# Patient Record
Sex: FEMALE | Race: BLACK OR AFRICAN AMERICAN | NOT HISPANIC OR LATINO | Employment: UNEMPLOYED | URBAN - METROPOLITAN AREA
[De-identification: names, ages, dates, MRNs, and addresses within clinical notes are randomized per-mention and may not be internally consistent; named-entity substitution may affect disease eponyms.]

---

## 2018-11-19 ENCOUNTER — TRANSCRIBE ORDERS (OUTPATIENT)
Dept: DIABETES SERVICES | Facility: HOSPITAL | Age: 23
End: 2018-11-19

## 2018-11-19 DIAGNOSIS — E11.9 DIABETES MELLITUS WITHOUT COMPLICATION (HCC): Primary | ICD-10-CM

## 2019-12-19 ENCOUNTER — HOSPITAL ENCOUNTER (EMERGENCY)
Facility: HOSPITAL | Age: 24
Discharge: HOME | End: 2019-12-19
Attending: EMERGENCY MEDICINE | Admitting: EMERGENCY MEDICINE

## 2019-12-19 ENCOUNTER — APPOINTMENT (OUTPATIENT)
Dept: GENERAL RADIOLOGY | Facility: HOSPITAL | Age: 24
End: 2019-12-19

## 2019-12-19 VITALS
OXYGEN SATURATION: 100 % | WEIGHT: 210 LBS | DIASTOLIC BLOOD PRESSURE: 80 MMHG | HEART RATE: 90 BPM | HEIGHT: 69 IN | TEMPERATURE: 98.5 F | BODY MASS INDEX: 31.1 KG/M2 | SYSTOLIC BLOOD PRESSURE: 115 MMHG | RESPIRATION RATE: 16 BRPM

## 2019-12-19 DIAGNOSIS — R05.3 PERSISTENT COUGH FOR 3 WEEKS OR LONGER: Primary | ICD-10-CM

## 2019-12-19 DIAGNOSIS — J40 BRONCHITIS: ICD-10-CM

## 2019-12-19 PROCEDURE — 94640 AIRWAY INHALATION TREATMENT: CPT

## 2019-12-19 PROCEDURE — 71046 X-RAY EXAM CHEST 2 VIEWS: CPT

## 2019-12-19 PROCEDURE — 99283 EMERGENCY DEPT VISIT LOW MDM: CPT

## 2019-12-19 RX ORDER — ALBUTEROL SULFATE 2.5 MG/3ML
2.5 SOLUTION RESPIRATORY (INHALATION) ONCE
Status: COMPLETED | OUTPATIENT
Start: 2019-12-19 | End: 2019-12-19

## 2019-12-19 RX ORDER — BENZONATATE 100 MG/1
100 CAPSULE ORAL 3 TIMES DAILY PRN
Qty: 21 CAPSULE | Refills: 0 | OUTPATIENT
Start: 2019-12-19 | End: 2022-06-30

## 2019-12-19 RX ORDER — ALBUTEROL SULFATE 90 UG/1
2 AEROSOL, METERED RESPIRATORY (INHALATION) EVERY 6 HOURS PRN
Qty: 6.7 G | Refills: 0 | OUTPATIENT
Start: 2019-12-19 | End: 2022-06-30

## 2019-12-19 RX ORDER — PREDNISONE 10 MG/1
TABLET ORAL
Qty: 21 TABLET | Refills: 0 | OUTPATIENT
Start: 2019-12-19 | End: 2022-06-30

## 2019-12-19 RX ORDER — CEFDINIR 300 MG/1
300 CAPSULE ORAL 2 TIMES DAILY
Qty: 20 CAPSULE | Refills: 0 | OUTPATIENT
Start: 2019-12-19 | End: 2022-06-30

## 2019-12-19 RX ADMIN — ALBUTEROL SULFATE 2.5 MG: 2.5 SOLUTION RESPIRATORY (INHALATION) at 11:58

## 2019-12-19 NOTE — ED PROVIDER NOTES
Subjective   Rashid Iglesias is a 24 y.o. female presenting to the emergency department for evaluation of sore throat and cough with blood in her sputum. She reports that she has been sick since Thanksgiving and was first evaluated for her complaints at [facility] on 11/25/19. She was then diagnosed with a viral upper respiratory infection and put on antibiotics. A few days later, she developed some right-sided facial pain that prompted her to return to the ED, wherein she was told that her pain was a result of an ear infection. She then developed a sore throat prompting her to return again, wherein she tested negative for strep throat. She developed some acute soreness in her throat yesterday. She notes associated chills and subjective fever, but has not taken her temperature. She is not currently on any antibiotics. She is unsure as to what may be triggering her symptoms. Her medical history is remarkable for type 2 diabetes mellitus, hypertension, and anemia. She is a non-smoker and denies any illicit drug or alcohol use. Her last normal menstrual period was 11/19/19. She is followed by the associates at [facility]. There are no other acute complaints at this time.      History provided by:  Patient  Sore Throat   Quality:  Sore  Severity:  Moderate  Onset quality:  Sudden  Duration:  1 day  Timing:  Constant  Progression:  Unchanged  Chronicity:  Recurrent  Relieved by:  None tried  Worsened by:  Nothing  Ineffective treatments:  None tried  Associated symptoms: chills, cough and fever (subjective)    Associated symptoms: no abdominal pain, no adenopathy, no chest pain, no ear discharge and no headaches        Review of Systems   Constitutional: Positive for chills and fever (subjective).   HENT: Positive for sore throat. Negative for ear discharge and sinus pain.    Respiratory: Positive for cough.    Cardiovascular: Negative for chest pain.   Gastrointestinal: Negative for abdominal  pain, nausea and vomiting.   Genitourinary: Negative for dysuria.   Musculoskeletal: Negative for back pain.   Skin: Negative for color change.   Allergic/Immunologic: Negative for immunocompromised state.   Neurological: Negative for headaches.   Hematological: Negative for adenopathy.   Psychiatric/Behavioral: Negative.    All other systems reviewed and are negative.      Past Medical History:   Diagnosis Date   • Anemia    • Diabetes mellitus (CMS/HCC)    • Hypertension        No Known Allergies    History reviewed. No pertinent surgical history.    History reviewed. No pertinent family history.    Social History     Socioeconomic History   • Marital status: Single     Spouse name: Not on file   • Number of children: Not on file   • Years of education: Not on file   • Highest education level: Not on file   Tobacco Use   • Smoking status: Never Smoker   • Smokeless tobacco: Never Used   Substance and Sexual Activity   • Alcohol use: Never     Frequency: Never   • Drug use: Never   • Sexual activity: Defer         Objective   Physical Exam   Constitutional: She is oriented to person, place, and time. She appears well-developed and well-nourished. She does not appear ill. No distress.        HENT:   Head: Normocephalic and atraumatic.   Right Ear: Tympanic membrane normal.   Left Ear: Tympanic membrane normal.   Mouth/Throat: Oropharynx is clear and moist.   No pharyngeal erythema or exudate. Normal TMs. No sinus tenderness.   Eyes: Pupils are equal, round, and reactive to light. Conjunctivae are normal. No scleral icterus.   Neck: Normal range of motion. Neck supple.   No cervical lymphadenopathy.   Cardiovascular: Normal rate, regular rhythm and normal heart sounds.   No murmur heard.  Pulmonary/Chest: Effort normal and breath sounds normal. No respiratory distress.   Abdominal: Soft. Bowel sounds are normal. There is no tenderness.   Musculoskeletal: Normal range of motion. She exhibits no edema.   No lower  extremity edema.   Neurological: She is alert and oriented to person, place, and time.   Skin: Skin is warm and dry.   Psychiatric: She has a normal mood and affect. Her behavior is normal.   Nursing note and vitals reviewed.      Procedures         ED Course   Nothing acute on CXR.  Cough has been present since before Thanksgiving.  Will tx with abx, steroids, albuterol MDI and Tessalon and have f/u with her PCP.  No results found for this or any previous visit (from the past 24 hour(s)).  Note: In addition to lab results from this visit, the labs listed above may include labs taken at another facility or during a different encounter within the last 24 hours. Please correlate lab times with ED admission and discharge times for further clarification of the services performed during this visit.    XR Chest 2 View   Preliminary Result   No evidence of active airspace disease.                Vitals:    12/19/19 1127 12/19/19 1130 12/19/19 1158 12/19/19 1200   BP: (!) 140/102 150/100  141/89   BP Location:       Patient Position:       Pulse:   90    Resp:   16    Temp:       TempSrc:       SpO2: 100% 99% 99% 100%   Weight:       Height:         Medications   albuterol (PROVENTIL) nebulizer solution 0.083% 2.5 mg/3mL (2.5 mg Nebulization Given 12/19/19 1158)     ECG/EMG Results (last 24 hours)     ** No results found for the last 24 hours. **        No orders to display                       MDM    Final diagnoses:   Persistent cough for 3 weeks or longer   Bronchitis       Documentation assistance provided by patti Leavitt.  Information recorded by the scribe was done at my direction and has been verified and validated by me.     Oralia Leavitt  12/19/19 1212       Tommy Calero, PA  12/19/19 1218

## 2022-06-30 PROCEDURE — 87529 HSV DNA AMP PROBE: CPT | Performed by: FAMILY MEDICINE

## 2022-07-02 ENCOUNTER — HOSPITAL ENCOUNTER (EMERGENCY)
Facility: HOSPITAL | Age: 27
Discharge: HOME | End: 2022-07-02
Attending: STUDENT IN AN ORGANIZED HEALTH CARE EDUCATION/TRAINING PROGRAM | Admitting: STUDENT IN AN ORGANIZED HEALTH CARE EDUCATION/TRAINING PROGRAM

## 2022-07-02 VITALS
WEIGHT: 190 LBS | SYSTOLIC BLOOD PRESSURE: 141 MMHG | DIASTOLIC BLOOD PRESSURE: 94 MMHG | OXYGEN SATURATION: 98 % | BODY MASS INDEX: 28.14 KG/M2 | HEIGHT: 69 IN | HEART RATE: 91 BPM | RESPIRATION RATE: 16 BRPM | TEMPERATURE: 98.2 F

## 2022-07-02 DIAGNOSIS — N94.9 GENITAL LESION, FEMALE: ICD-10-CM

## 2022-07-02 DIAGNOSIS — Z91.199 H/O NONCOMPLIANCE WITH MEDICAL TREATMENT, PRESENTING HAZARDS TO HEALTH: ICD-10-CM

## 2022-07-02 DIAGNOSIS — Z86.39 HISTORY OF DIABETES MELLITUS: ICD-10-CM

## 2022-07-02 DIAGNOSIS — Z86.79 HISTORY OF HYPERTENSION: ICD-10-CM

## 2022-07-02 DIAGNOSIS — R10.2 VAGINAL PAIN: Primary | ICD-10-CM

## 2022-07-02 PROCEDURE — 99283 EMERGENCY DEPT VISIT LOW MDM: CPT

## 2022-07-02 RX ORDER — VALACYCLOVIR HYDROCHLORIDE 500 MG/1
1000 TABLET, FILM COATED ORAL EVERY 12 HOURS SCHEDULED
Status: DISCONTINUED | OUTPATIENT
Start: 2022-07-02 | End: 2022-07-02

## 2022-07-02 RX ORDER — VALACYCLOVIR HYDROCHLORIDE 500 MG/1
1000 TABLET, FILM COATED ORAL ONCE
Status: COMPLETED | OUTPATIENT
Start: 2022-07-02 | End: 2022-07-02

## 2022-07-02 RX ORDER — VALACYCLOVIR HYDROCHLORIDE 1 G/1
1000 TABLET, FILM COATED ORAL 2 TIMES DAILY
Qty: 14 TABLET | Refills: 0 | Status: SHIPPED | OUTPATIENT
Start: 2022-07-02

## 2022-07-02 RX ORDER — LIDOCAINE HYDROCHLORIDE 20 MG/ML
10 SOLUTION OROPHARYNGEAL
Status: DISCONTINUED | OUTPATIENT
Start: 2022-07-02 | End: 2022-07-02 | Stop reason: HOSPADM

## 2022-07-02 RX ADMIN — LIDOCAINE HYDROCHLORIDE 10 ML: 20 SOLUTION ORAL; TOPICAL at 05:49

## 2022-07-02 RX ADMIN — VALACYCLOVIR HYDROCHLORIDE 1000 MG: 500 TABLET, FILM COATED ORAL at 05:49

## 2022-07-02 NOTE — DISCHARGE INSTRUCTIONS
Patient has multiple, painful, genital lesions.  Herpes culture is in process.  Physical exam does not appear consistent with vaginal yeast infection or folliculitis.  We will treat patient for genital herpes with Valtrex 1 g by mouth twice daily x7 days.  Recommend first available recheck with Dr. Harrison, gynecologist.  We also sent patient home with viscous lidocaine and recommend her to use this topically every 3 hours as needed for discomfort.  Return to the ER if worsening symptoms.  Strongly recommend close follow-up with PCP this week as scheduled for long-term management of diabetes and hypertension.  Patient needs to get back on some medications for these chronic health conditions.

## 2022-07-02 NOTE — ED PROVIDER NOTES
Subjective   This is a 27-year-old female that presents the ER with vaginal pain x3 days with multiple genital lesions that seem to be increasing in number.  Patient denies any new sexual partners.  Patient says that she has been with the same sexual partner for over 1 year.  Patient denies any vaginal discharge or irregular vaginal bleeding.  Patient has past medical history significant for diabetes mellitus and hypertension.  Both of these conditions are untreated currently because patient says that her medications were not covered by insurance.  She is getting ready to establish with a new PCP next week and start back on antidiabetic medications.  She was seen at [facility] on 6/30/2022 and diagnosed with probable yeast infection, as well as folliculitis.  Patient says that she has herpes culture in process.  She was prescribed Diflucan as well as Keflex.  Patient does not have a routine gynecologist.  Patient came in because she says that there are more lesions on her vagina on the inside.  She reports significant pain.  She denies any dysuria, urgency, or frequency, but when she urinates, the urine causes pain to the vagina due to the genital lesions.  Patient denies any systemic symptoms of fever, chills, abdominal pain, or nausea/vomiting.  No other concerns at this time.      History provided by:  Patient  Vaginal Pain  Location:  Vaginal pain x 3 days, recent diagnosis of folliculitis and yeast infection.  Timing:  Constant  Chronicity:  New  Context:  Pt reports vaginal pain and discomfort.   LMP: 6/11/22.  Patient denies any vaginal discharge or labial swelling. In a monogamous relationship.  Seen at [facility] 6/30/22 and dx with folliculitis and yeast infection.  Rx for Diflucan and Keflex.  Here because sxs are worse.  Ineffective treatments:  Rx at [facility] for Diflucan and Keflex for questionable folliculitis.  Associated symptoms: rash (vaginal region)    Associated symptoms: no abdominal pain, no chest pain, no  fatigue, no fever, no nausea and no vomiting    Risk factors:  History of DM.  Pt has not been taking anti-diabetic meds because her insurance didn't cover the meds.  She will get back into a PCP next week to get DM under control.      Review of Systems   Constitutional: Negative.  Negative for activity change, appetite change, chills, diaphoresis, fatigue and fever.   Cardiovascular: Negative for chest pain.   Gastrointestinal: Negative.  Negative for abdominal pain, nausea and vomiting.   Genitourinary: Positive for genital sores (Multiple genital lesions on the vagina, quite painful.) and vaginal pain. Negative for decreased urine volume, dysuria, flank pain, frequency, hematuria, vaginal bleeding and vaginal discharge.        LMP: 6/11/22   Musculoskeletal: Negative.  Negative for back pain.   Skin: Positive for rash (vaginal region).   All other systems reviewed and are negative.      Past Medical History:   Diagnosis Date   • Anemia    • Diabetes mellitus (HCC)    • Hypertension        No Known Allergies    No past surgical history on file.    No family history on file.    Social History     Socioeconomic History   • Marital status: Single   Tobacco Use   • Smoking status: Never Smoker   • Smokeless tobacco: Never Used   Substance and Sexual Activity   • Alcohol use: Never   • Drug use: Never   • Sexual activity: Defer           Objective   Physical Exam  Vitals and nursing note reviewed.   Constitutional:       Appearance: Normal appearance.   HENT:      Head: Normocephalic and atraumatic.      Nose: Nose normal.      Mouth/Throat:      Mouth: Mucous membranes are moist.      Pharynx: Oropharynx is clear.   Eyes:      Extraocular Movements: Extraocular movements intact.      Conjunctiva/sclera: Conjunctivae normal.      Pupils: Pupils are equal, round, and reactive to light.   Cardiovascular:      Rate and Rhythm: Regular rhythm. Tachycardia present.  No extrasystoles are present.     Pulses: Normal pulses.       Heart sounds: Normal heart sounds.      Comments: Mild tachycardia  Pulmonary:      Effort: Pulmonary effort is normal.      Breath sounds: Normal breath sounds.   Abdominal:      General: Bowel sounds are normal. There is no distension.      Palpations: Abdomen is soft.      Tenderness: There is no abdominal tenderness. There is no right CVA tenderness, left CVA tenderness, guarding or rebound.      Comments: Abdomen soft and nontender.   Genitourinary:     Vagina: Tenderness and lesions present. No vaginal discharge, erythema or bleeding.      Comments: Patient has numerous genital lesions inside the vaginal mucosa.  There is no significant labial swelling.  No vaginal discharge.  These lesions appear almost vesicular in nature and are very sensitive and tender.  Exam is concerning for genital herpes.  There is definitely no evidence of folliculitis to the groin or inguinal region.  Musculoskeletal:         General: Normal range of motion.      Cervical back: Normal range of motion and neck supple.   Skin:     General: Skin is warm and dry.   Neurological:      General: No focal deficit present.      Mental Status: She is alert.         Procedures           ED Course  ED Course as of 07/02/22 0510   Sat Jul 02, 2022   0504 Pelvic exam is concerning for genital herpes.  Patient has herpes culture in process from [facility] on 6/30/2022.  She is already being treated for possible vaginal candidiasis and folliculitis with Diflucan and Keflex.  We will prescribe Valtrex 1 g by mouth twice daily x7 days.  We will give close follow-up with GYN, Dr. Harrison.  We also will give 3, 10 mL containers of viscous lidocaine to help topically with discomfort.  Patient verbalized understanding.  Also strongly recommend that she follow-up closely with PCP this week as scheduled for chronic management of hypertension and diabetes mellitus.  Exam does not appear consistent with folliculitis or vaginal yeast infection. [FC]      ED Course  "User Index  [FC] Adela Ochoa PA-C            No results found for this or any previous visit (from the past 24 hour(s)).  Note: In addition to lab results from this visit, the labs listed above may include labs taken at another facility or during a different encounter within the last 24 hours. Please correlate lab times with ED admission and discharge times for further clarification of the services performed during this visit.    No orders to display     Vitals:    07/02/22 0247   BP: (!) 148/104   BP Location: Left arm   Patient Position: Sitting   Pulse: 115   Resp: 14   Temp: 98.2 °F (36.8 °C)   TempSrc: Oral   SpO2: 100%   Weight: 86.2 kg (190 lb)   Height: 175.3 cm (69\")     Medications   Lidocaine Viscous HCl (XYLOCAINE) 2 % solution 10 mL (has no administration in time range)   valACYclovir (VALTREX) tablet 1,000 mg (has no administration in time range)     ECG/EMG Results (last 24 hours)     ** No results found for the last 24 hours. **        No orders to display                                         MDM    Final diagnoses:   Vaginal pain   Genital lesion, female   History of diabetes mellitus   History of hypertension   H/O noncompliance with medical treatment, presenting hazards to health       ED Disposition  ED Disposition     ED Disposition   Discharge    Condition   Stable    Comment   --             Pari Harrison MD  [address] [facility]  [address] [place] [address]  [phone]    Call in 3 days  Call Tuesday for first available recheck    [facility] Emergency Department  [address] [facility] [address] [phone]    If symptoms worsen         Medication List      New Prescriptions    valACYclovir 1000 MG tablet  Commonly known as: VALTREX  Take 1 tablet by mouth 2 (Two) Times a Day.           Where to Get Your Medications      These medications were sent to [facility] [address] [place] [address] - [phone] PH - " [phone] FX  [address] [address]    Phone: [phone]   · valACYclovir 1000 MG tablet          Adela Ochoa PA-C  07/02/22 0510

## 2022-07-21 ENCOUNTER — LAB (OUTPATIENT)
Dept: LAB | Facility: HOSPITAL | Age: 27
End: 2022-07-21

## 2022-07-21 ENCOUNTER — OFFICE VISIT (OUTPATIENT)
Dept: FAMILY MEDICINE CLINIC | Facility: CLINIC | Age: 27
End: 2022-07-21

## 2022-07-21 ENCOUNTER — PATIENT ROUNDING (BHMG ONLY) (OUTPATIENT)
Dept: FAMILY MEDICINE CLINIC | Facility: CLINIC | Age: 27
End: 2022-07-21

## 2022-07-21 VITALS
SYSTOLIC BLOOD PRESSURE: 122 MMHG | HEART RATE: 111 BPM | OXYGEN SATURATION: 99 % | BODY MASS INDEX: 28.85 KG/M2 | DIASTOLIC BLOOD PRESSURE: 82 MMHG | HEIGHT: 69 IN | WEIGHT: 194.8 LBS

## 2022-07-21 DIAGNOSIS — Z00.00 ENCOUNTER FOR MEDICAL EXAMINATION TO ESTABLISH CARE: ICD-10-CM

## 2022-07-21 DIAGNOSIS — E11.9 TYPE 2 DIABETES MELLITUS WITHOUT COMPLICATION, WITHOUT LONG-TERM CURRENT USE OF INSULIN: ICD-10-CM

## 2022-07-21 DIAGNOSIS — E11.65 TYPE 2 DIABETES MELLITUS WITH HYPERGLYCEMIA, WITH LONG-TERM CURRENT USE OF INSULIN: Primary | ICD-10-CM

## 2022-07-21 DIAGNOSIS — D50.9 IRON DEFICIENCY ANEMIA, UNSPECIFIED IRON DEFICIENCY ANEMIA TYPE: ICD-10-CM

## 2022-07-21 DIAGNOSIS — Z11.3 SCREENING EXAMINATION FOR STD (SEXUALLY TRANSMITTED DISEASE): ICD-10-CM

## 2022-07-21 DIAGNOSIS — Z79.4 TYPE 2 DIABETES MELLITUS WITH HYPERGLYCEMIA, WITH LONG-TERM CURRENT USE OF INSULIN: Primary | ICD-10-CM

## 2022-07-21 DIAGNOSIS — N96 HISTORY OF RECURRENT MISCARRIAGES: ICD-10-CM

## 2022-07-21 DIAGNOSIS — N92.0 MENORRHAGIA WITH REGULAR CYCLE: ICD-10-CM

## 2022-07-21 PROBLEM — E11.3393 MODERATE NONPROLIFERATIVE DIABETIC RETINOPATHY OF BOTH EYES WITHOUT MACULAR EDEMA ASSOCIATED WITH TYPE 2 DIABETES MELLITUS (HCC): Status: RESOLVED | Noted: 2021-07-16 | Resolved: 2022-07-21

## 2022-07-21 PROBLEM — E11.3393 MODERATE NONPROLIFERATIVE DIABETIC RETINOPATHY OF BOTH EYES WITHOUT MACULAR EDEMA ASSOCIATED WITH TYPE 2 DIABETES MELLITUS (HCC): Status: ACTIVE | Noted: 2021-07-16

## 2022-07-21 LAB
ALBUMIN SERPL-MCNC: 4.4 G/DL (ref 3.5–5.2)
ALBUMIN/GLOB SERPL: 1.1 G/DL
ALP SERPL-CCNC: 73 U/L (ref 39–117)
ALT SERPL W P-5'-P-CCNC: 47 U/L (ref 1–33)
ANION GAP SERPL CALCULATED.3IONS-SCNC: 12.2 MMOL/L (ref 5–15)
AST SERPL-CCNC: 53 U/L (ref 1–32)
BILIRUB SERPL-MCNC: 0.4 MG/DL (ref 0–1.2)
BUN SERPL-MCNC: 8 MG/DL (ref 6–20)
BUN/CREAT SERPL: 13.3 (ref 7–25)
CALCIUM SPEC-SCNC: 9.2 MG/DL (ref 8.6–10.5)
CHLORIDE SERPL-SCNC: 100 MMOL/L (ref 98–107)
CHOLEST SERPL-MCNC: 180 MG/DL (ref 0–200)
CO2 SERPL-SCNC: 24.8 MMOL/L (ref 22–29)
CREAT SERPL-MCNC: 0.6 MG/DL (ref 0.57–1)
EGFRCR SERPLBLD CKD-EPI 2021: 127.1 ML/MIN/1.73
EXPIRATION DATE: NORMAL
GLOBULIN UR ELPH-MCNC: 4.1 GM/DL
GLUCOSE SERPL-MCNC: 253 MG/DL (ref 65–99)
HBA1C MFR BLD: 11.2 %
HDLC SERPL-MCNC: 56 MG/DL (ref 40–60)
IRON 24H UR-MRATE: 15 MCG/DL (ref 37–145)
IRON SATN MFR SERPL: 2 % (ref 20–50)
LDLC SERPL CALC-MCNC: 113 MG/DL (ref 0–100)
LDLC/HDLC SERPL: 2.02 {RATIO}
Lab: NORMAL
POTASSIUM SERPL-SCNC: 3.7 MMOL/L (ref 3.5–5.2)
PROT SERPL-MCNC: 8.5 G/DL (ref 6–8.5)
SODIUM SERPL-SCNC: 137 MMOL/L (ref 136–145)
TIBC SERPL-MCNC: 642 MCG/DL (ref 298–536)
TRANSFERRIN SERPL-MCNC: 431 MG/DL (ref 200–360)
TRIGL SERPL-MCNC: 55 MG/DL (ref 0–150)
VLDLC SERPL-MCNC: 11 MG/DL (ref 5–40)

## 2022-07-21 PROCEDURE — 87591 N.GONORRHOEAE DNA AMP PROB: CPT | Performed by: STUDENT IN AN ORGANIZED HEALTH CARE EDUCATION/TRAINING PROGRAM

## 2022-07-21 PROCEDURE — 80061 LIPID PANEL: CPT

## 2022-07-21 PROCEDURE — 84466 ASSAY OF TRANSFERRIN: CPT

## 2022-07-21 PROCEDURE — 82570 ASSAY OF URINE CREATININE: CPT | Performed by: STUDENT IN AN ORGANIZED HEALTH CARE EDUCATION/TRAINING PROGRAM

## 2022-07-21 PROCEDURE — 86341 ISLET CELL ANTIBODY: CPT

## 2022-07-21 PROCEDURE — 99204 OFFICE O/P NEW MOD 45 MIN: CPT | Performed by: STUDENT IN AN ORGANIZED HEALTH CARE EDUCATION/TRAINING PROGRAM

## 2022-07-21 PROCEDURE — 86337 INSULIN ANTIBODIES: CPT

## 2022-07-21 PROCEDURE — 82728 ASSAY OF FERRITIN: CPT

## 2022-07-21 PROCEDURE — 82043 UR ALBUMIN QUANTITATIVE: CPT | Performed by: STUDENT IN AN ORGANIZED HEALTH CARE EDUCATION/TRAINING PROGRAM

## 2022-07-21 PROCEDURE — 85007 BL SMEAR W/DIFF WBC COUNT: CPT

## 2022-07-21 PROCEDURE — 80050 GENERAL HEALTH PANEL: CPT

## 2022-07-21 PROCEDURE — 83540 ASSAY OF IRON: CPT

## 2022-07-21 PROCEDURE — 3046F HEMOGLOBIN A1C LEVEL >9.0%: CPT | Performed by: STUDENT IN AN ORGANIZED HEALTH CARE EDUCATION/TRAINING PROGRAM

## 2022-07-21 PROCEDURE — 87491 CHLMYD TRACH DNA AMP PROBE: CPT | Performed by: STUDENT IN AN ORGANIZED HEALTH CARE EDUCATION/TRAINING PROGRAM

## 2022-07-21 PROCEDURE — 83036 HEMOGLOBIN GLYCOSYLATED A1C: CPT | Performed by: STUDENT IN AN ORGANIZED HEALTH CARE EDUCATION/TRAINING PROGRAM

## 2022-07-21 PROCEDURE — 87661 TRICHOMONAS VAGINALIS AMPLIF: CPT | Performed by: STUDENT IN AN ORGANIZED HEALTH CARE EDUCATION/TRAINING PROGRAM

## 2022-07-21 RX ORDER — BLOOD-GLUCOSE METER
1 KIT MISCELLANEOUS DAILY
Qty: 1 EACH | Refills: 0 | Status: SHIPPED | OUTPATIENT
Start: 2022-07-21

## 2022-07-21 RX ORDER — LANCETS
EACH MISCELLANEOUS
Qty: 300 EACH | Refills: 12 | Status: SHIPPED | OUTPATIENT
Start: 2022-07-21

## 2022-07-21 RX ORDER — BLOOD SUGAR DIAGNOSTIC
1 STRIP MISCELLANEOUS NIGHTLY
Qty: 90 EACH | Refills: 1 | Status: SHIPPED | OUTPATIENT
Start: 2022-07-21

## 2022-07-21 NOTE — PROGRESS NOTES
New Patient Office Visit      Patient Name: Rashid Iglesias  : 1995   MRN: [id number]   Care Team: Patient Care Team:  Ayla Guevara DO as PCP - General (Internal Medicine)    Chief Complaint:    Chief Complaint   Patient presents with   • general adult checkup       History of Present Illness: Rashid Iglesias is a 27 y.o. female with HTN, diabetes who is here today to establish care.  She has 7 year old daughter. Not currently working.     T2DM - diagnosed at age 20, unsure if type 1 or 2. Reports strong family history of diabetes. Previously tried metformin but unable to tolerate due to GI side effects. Tried glipizide but this was ineffective. She has not been on medication for >1 year. a1c is 11.2 today. Admits to fatigue and some burning/neuropathy in her feet at night which comes and goes.     HTN - previously diagnosed years ago but not currently taking any medication     KAY - reports history of heavy menstrual cycles.     HSV 2- had her first outbreak of genital herpes earlier this month, resolved with Valtrex and she has established an appointment with GYN in 2 weeks to discuss further Dr. Pari Harrison. She denies symptoms today.   She reports history of 4 miscarriages. Wants more kids in the future and plans to discuss with OBGYN    Subjective      Review of Systems:   Review of Systems - See HPI    Past Medical History:   Past Medical History:   Diagnosis Date   • Anemia    • Anxiety    • Asthma    • Depression    • Diabetes mellitus (HCC)    • GERD (gastroesophageal reflux disease)    • Headache    • Hypertension    • Urinary tract infection        Past Surgical History: History reviewed. No pertinent surgical history.    Family History:   Family History   Problem Relation Age of Onset   • Arthritis Father    • Asthma Father    • Hypertension Father    • Diabetes Maternal Grandmother    • Cancer Maternal Grandmother    • Hypertension Paternal Grandmother    • Arthritis Paternal  "Grandmother    • Diabetes Paternal Grandmother    • Miscarriages / Stillbirths Paternal Grandmother    • Thyroid disease Paternal Grandmother        Social History:   Social History     Socioeconomic History   • Marital status: Single   Tobacco Use   • Smoking status: Never Smoker   • Smokeless tobacco: Never Used   Substance and Sexual Activity   • Alcohol use: Not Currently   • Drug use: Never   • Sexual activity: Yes     Partners: Male     Birth control/protection: None       Tobacco History:   Social History     Tobacco Use   Smoking Status Never Smoker   Smokeless Tobacco Never Used       Medications:     Current Outpatient Medications:   •  glucose blood test strip, Use as instructed, Disp: 300 each, Rfl: 5  •  glucose monitor monitoring kit, 1 each Daily., Disp: 1 each, Rfl: 0  •  Insulin Glargine (LANTUS SOLOSTAR) 100 UNIT/ML injection pen, Inject 10 Units under the skin into the appropriate area as directed Every Night., Disp: 9 mL, Rfl: 0  •  Insulin Pen Needle (Pen Needles) 32G X 5 MM misc, 1 each Every Night., Disp: 90 each, Rfl: 1  •  Lancets (onetouch ultrasoft) lancets, Use to check glucose 3 times daily, Disp: 300 each, Rfl: 12  •  valACYclovir (VALTREX) 1000 MG tablet, Take 1 tablet by mouth 2 (Two) Times a Day., Disp: 14 tablet, Rfl: 0    Allergies:   No Known Allergies    Objective     Physical Exam:  Vital Signs:   Vitals:    07/21/22 1422   BP: 122/82   Pulse: 111   SpO2: 99%   Weight: 88.4 kg (194 lb 12.8 oz)   Height: 175.3 cm (69\")     Body mass index is 28.77 kg/m².     Physical Exam  Vitals reviewed.   Constitutional:       Appearance: Normal appearance. She is not ill-appearing.   Cardiovascular:      Rate and Rhythm: Normal rate.      Pulses: Normal pulses.      Heart sounds: Normal heart sounds.      Comments: Hr 80 at time of exam  Pulmonary:      Effort: Pulmonary effort is normal. No respiratory distress.   Abdominal:      General: There is no distension.   Skin:     General: Skin is " warm and dry.   Neurological:      Mental Status: She is alert.   Psychiatric:         Mood and Affect: Mood normal.         Behavior: Behavior normal.         Judgment: Judgment normal.         Assessment / Plan      Assessment/Plan:   Problems Addressed This Visit  Diagnoses and all orders for this visit:    1. Type 2 diabetes mellitus with hyperglycemia, with long-term current use of insulin (HCC) (Primary)  -     Lipid Panel; Future  -     Comprehensive Metabolic Panel; Future  -     TSH; Future  -     Ambulatory Referral to Endocrinology  -     Microalbumin / Creatinine Urine Ratio - Urine, Clean Catch; Future  -     CBC & Differential; Future  -     POC Glycosylated Hemoglobin (Hb A1C)  -     Ambulatory Referral for Diabetic Eye Exam-Optometry  -     Glutamic Acid Decarboxylase; Future  -     Insulin Antibody; Future  -     Anti-islet Cell Antibody; Future  -     Ambulatory Referral to Diabetic Education  -     Insulin Glargine (LANTUS SOLOSTAR) 100 UNIT/ML injection pen; Inject 10 Units under the skin into the appropriate area as directed Every Night.  Dispense: 9 mL; Refill: 0  -     glucose monitor monitoring kit; 1 each Daily.  Dispense: 1 each; Refill: 0  -     glucose blood test strip; Use as instructed  Dispense: 300 each; Refill: 5  -     Insulin Pen Needle (Pen Needles) 32G X 5 MM misc; 1 each Every Night.  Dispense: 90 each; Refill: 1  -     Lancets (onetouch ultrasoft) lancets; Use to check glucose 3 times daily  Dispense: 300 each; Refill: 12    A1c 11.2% today. Diagnosed at 20 but has not been on medication for >1 year. Given her history and lack of response to prior oral agents, I suspect possible T1DM vs ELIZABETH rather than T2DM. Will obtain labs today to assess. Given this and very elevated A1c, we will start lantus 10u qhs, increase by 2u every 4 days if FSBG consistently >180-200. Discussed ultimate glycemic goals of A1c <7% and reassured her it may take time and adjusting to get there. We  discussed the disease of diabetes and effects of uncontrolled hyperglycemia on multiple organs. We discussed dietary goals and importance of active lifestyle. Referred to Endocrinology and Diabetes Education. Referred for diabetic eye exam. Rx for glucose monitoring kit, advised checking sugard 3x daily, bring log to next appointment. Will have close follow up in 2-3 weeks.    Discussed dangers of hypoglycemia and she will let me know if she experiences this.     2. Encounter for medical examination to establish care  -     Lipid Panel; Future  -     Comprehensive Metabolic Panel; Future  -     TSH; Future  -     Ambulatory Referral to Endocrinology  -     Microalbumin / Creatinine Urine Ratio - Urine, Clean Catch; Future  -     CBC & Differential; Future  Discussed importance of preventative care including vaccinations, age appropriate cancer screening, routine lab work, healthy diet, and active lifestyle.    3. Iron deficiency anemia, unspecified iron deficiency anemia type  -     CBC & Differential; Future  -     Iron Profile; Future  -     Ferritin; Future    4. History of recurrent miscarriages  Has appointment to establish with OBGYN in 2 weeks to discuss this, heavy menstrual cycles, and for family planning.     5. Screening examination for STD (sexually transmitted disease)  -     Chlamydia trachomatis, Neisseria gonorrhoeae, Trichomonas vaginalis, PCR - Swab, Urine, Clean Catch; Future    6. Menorrhagia with regular cycle          Plan of care reviewed with patient at the conclusion of today's visit. Education was provided regarding diagnosis and management.  Patient verbalizes understanding of and agreement with management plan.      Follow Up:   Return for 2-3 weeks for diabetes check up.          DO AVA Shankar RD  [facility]  [address]  [facility] [address]  Fax [phone]  Phone [phone]

## 2022-07-22 DIAGNOSIS — D50.9 IRON DEFICIENCY ANEMIA, UNSPECIFIED IRON DEFICIENCY ANEMIA TYPE: Primary | ICD-10-CM

## 2022-07-22 LAB
ALBUMIN UR-MCNC: 3.9 MG/DL
ANISOCYTOSIS BLD QL: ABNORMAL
BURR CELLS BLD QL SMEAR: ABNORMAL
CREAT UR-MCNC: 126.9 MG/DL
DEPRECATED RDW RBC AUTO: 38.7 FL (ref 37–54)
EOSINOPHIL # BLD MANUAL: 0.14 10*3/MM3 (ref 0–0.4)
EOSINOPHIL NFR BLD MANUAL: 2 % (ref 0.3–6.2)
ERYTHROCYTE [DISTWIDTH] IN BLOOD BY AUTOMATED COUNT: 17 % (ref 12.3–15.4)
FERRITIN SERPL-MCNC: 7.09 NG/ML (ref 13–150)
HCT VFR BLD AUTO: 32.5 % (ref 34–46.6)
HGB BLD-MCNC: 8.6 G/DL (ref 12–15.9)
HYPOCHROMIA BLD QL: ABNORMAL
LYMPHOCYTES # BLD MANUAL: 3.75 10*3/MM3 (ref 0.7–3.1)
LYMPHOCYTES NFR BLD MANUAL: 6.1 % (ref 5–12)
MCH RBC QN AUTO: 17.4 PG (ref 26.6–33)
MCHC RBC AUTO-ENTMCNC: 26.5 G/DL (ref 31.5–35.7)
MCV RBC AUTO: 65.9 FL (ref 79–97)
MICROALBUMIN/CREAT UR: 30.7 MG/G
MICROCYTES BLD QL: ABNORMAL
MONOCYTES # BLD: 0.44 10*3/MM3 (ref 0.1–0.9)
NEUTROPHILS # BLD AUTO: 2.87 10*3/MM3 (ref 1.7–7)
NEUTROPHILS NFR BLD MANUAL: 39.8 % (ref 42.7–76)
OVALOCYTES BLD QL SMEAR: ABNORMAL
PLAT MORPH BLD: NORMAL
PLATELET # BLD AUTO: 915 10*3/MM3 (ref 140–450)
PMV BLD AUTO: 9.2 FL (ref 6–12)
POIKILOCYTOSIS BLD QL SMEAR: ABNORMAL
POLYCHROMASIA BLD QL SMEAR: ABNORMAL
RBC # BLD AUTO: 4.93 10*6/MM3 (ref 3.77–5.28)
TSH SERPL DL<=0.05 MIU/L-ACNC: 1.31 UIU/ML (ref 0.27–4.2)
VARIANT LYMPHS NFR BLD MANUAL: 52 % (ref 19.6–45.3)
WBC MORPH BLD: NORMAL
WBC NRBC COR # BLD: 7.21 10*3/MM3 (ref 3.4–10.8)

## 2022-07-22 RX ORDER — DOXYCYCLINE HYCLATE 50 MG/1
324 CAPSULE, GELATIN COATED ORAL
Qty: 90 TABLET | Refills: 1 | Status: SHIPPED | OUTPATIENT
Start: 2022-07-22

## 2022-07-25 LAB — PANC ISLET CELL AB TITR SER: NEGATIVE {TITER}

## 2022-07-26 LAB
C TRACH RRNA SPEC QL NAA+PROBE: POSITIVE
GAD65 AB SER IA-ACNC: <5 U/ML (ref 0–5)
N GONORRHOEA RRNA SPEC QL NAA+PROBE: NEGATIVE
T VAGINALIS RRNA SPEC QL NAA+PROBE: NEGATIVE

## 2022-07-27 DIAGNOSIS — A74.9 CHLAMYDIA: Primary | ICD-10-CM

## 2022-07-27 RX ORDER — DOXYCYCLINE HYCLATE 100 MG/1
100 CAPSULE ORAL 2 TIMES DAILY
Qty: 14 CAPSULE | Refills: 0 | Status: SHIPPED | OUTPATIENT
Start: 2022-07-27

## 2022-07-30 LAB — INSULIN AB SER-ACNC: <5 UU/ML

## 2022-08-03 PROBLEM — E11.65 TYPE 2 DIABETES MELLITUS WITH HYPERGLYCEMIA, WITHOUT LONG-TERM CURRENT USE OF INSULIN (HCC): Status: ACTIVE | Noted: 2022-07-21

## 2024-09-18 ENCOUNTER — OFFICE VISIT (OUTPATIENT)
Dept: FAMILY MEDICINE CLINIC | Facility: CLINIC | Age: 29
End: 2024-09-18
Payer: COMMERCIAL

## 2024-09-18 ENCOUNTER — LAB (OUTPATIENT)
Dept: LAB | Facility: HOSPITAL | Age: 29
End: 2024-09-18
Payer: COMMERCIAL

## 2024-09-18 VITALS
OXYGEN SATURATION: 96 % | WEIGHT: 191 LBS | SYSTOLIC BLOOD PRESSURE: 124 MMHG | DIASTOLIC BLOOD PRESSURE: 86 MMHG | BODY MASS INDEX: 28.29 KG/M2 | HEART RATE: 89 BPM | HEIGHT: 69 IN

## 2024-09-18 DIAGNOSIS — E11.65 TYPE 2 DIABETES MELLITUS WITH HYPERGLYCEMIA, WITHOUT LONG-TERM CURRENT USE OF INSULIN: Primary | ICD-10-CM

## 2024-09-18 DIAGNOSIS — D50.9 IRON DEFICIENCY ANEMIA, UNSPECIFIED IRON DEFICIENCY ANEMIA TYPE: ICD-10-CM

## 2024-09-18 DIAGNOSIS — F32.A ANXIETY AND DEPRESSION: ICD-10-CM

## 2024-09-18 DIAGNOSIS — E11.65 TYPE 2 DIABETES MELLITUS WITH HYPERGLYCEMIA, WITHOUT LONG-TERM CURRENT USE OF INSULIN: ICD-10-CM

## 2024-09-18 DIAGNOSIS — F41.9 ANXIETY AND DEPRESSION: ICD-10-CM

## 2024-09-18 DIAGNOSIS — E11.40 TYPE 2 DIABETES MELLITUS WITH DIABETIC NEUROPATHY, WITHOUT LONG-TERM CURRENT USE OF INSULIN: ICD-10-CM

## 2024-09-18 LAB
ALBUMIN UR-MCNC: 9 MG/DL
CREAT UR-MCNC: 55.9 MG/DL
EXPIRATION DATE: ABNORMAL
HBA1C MFR BLD: 11.1 % (ref 4.5–5.7)
Lab: ABNORMAL
MICROALBUMIN/CREAT UR: 161 MG/G (ref 0–29)

## 2024-09-18 PROCEDURE — 82570 ASSAY OF URINE CREATININE: CPT

## 2024-09-18 PROCEDURE — 99214 OFFICE O/P EST MOD 30 MIN: CPT | Performed by: STUDENT IN AN ORGANIZED HEALTH CARE EDUCATION/TRAINING PROGRAM

## 2024-09-18 PROCEDURE — 82043 UR ALBUMIN QUANTITATIVE: CPT

## 2024-09-18 PROCEDURE — 1159F MED LIST DOCD IN RCRD: CPT | Performed by: STUDENT IN AN ORGANIZED HEALTH CARE EDUCATION/TRAINING PROGRAM

## 2024-09-18 PROCEDURE — 1160F RVW MEDS BY RX/DR IN RCRD: CPT | Performed by: STUDENT IN AN ORGANIZED HEALTH CARE EDUCATION/TRAINING PROGRAM

## 2024-09-18 PROCEDURE — 83036 HEMOGLOBIN GLYCOSYLATED A1C: CPT | Performed by: STUDENT IN AN ORGANIZED HEALTH CARE EDUCATION/TRAINING PROGRAM

## 2024-09-18 PROCEDURE — 3046F HEMOGLOBIN A1C LEVEL >9.0%: CPT | Performed by: STUDENT IN AN ORGANIZED HEALTH CARE EDUCATION/TRAINING PROGRAM

## 2024-09-18 RX ORDER — ESCITALOPRAM OXALATE 10 MG/1
10 TABLET ORAL DAILY
Qty: 30 TABLET | Refills: 2 | Status: SHIPPED | OUTPATIENT
Start: 2024-09-18

## 2024-09-18 RX ORDER — METFORMIN HCL 500 MG
500 TABLET, EXTENDED RELEASE 24 HR ORAL
Qty: 30 TABLET | Refills: 5 | Status: SHIPPED | OUTPATIENT
Start: 2024-09-18

## 2024-09-18 RX ORDER — LANCETS
EACH MISCELLANEOUS
Qty: 300 EACH | Refills: 12 | Status: SHIPPED | OUTPATIENT
Start: 2024-09-18

## 2024-09-18 RX ORDER — BLOOD-GLUCOSE METER
1 KIT MISCELLANEOUS DAILY
Qty: 1 EACH | Refills: 0 | Status: SHIPPED | OUTPATIENT
Start: 2024-09-18

## 2024-09-19 DIAGNOSIS — E11.65 TYPE 2 DIABETES MELLITUS WITH HYPERGLYCEMIA, WITHOUT LONG-TERM CURRENT USE OF INSULIN: Primary | ICD-10-CM

## 2024-09-19 DIAGNOSIS — E11.21 DIABETIC NEPHROPATHY ASSOCIATED WITH TYPE 2 DIABETES MELLITUS: ICD-10-CM

## 2024-09-19 RX ORDER — LISINOPRIL 5 MG/1
5 TABLET ORAL DAILY
Qty: 30 TABLET | Refills: 2 | Status: SHIPPED | OUTPATIENT
Start: 2024-09-19

## 2024-09-24 ENCOUNTER — PRIOR AUTHORIZATION (OUTPATIENT)
Dept: FAMILY MEDICINE CLINIC | Facility: CLINIC | Age: 29
End: 2024-09-24
Payer: COMMERCIAL

## 2024-10-29 ENCOUNTER — TELEPHONE (OUTPATIENT)
Dept: FAMILY MEDICINE CLINIC | Facility: CLINIC | Age: 29
End: 2024-10-29

## 2024-10-29 NOTE — TELEPHONE ENCOUNTER
Attempted to contact patient to see if she could reschedule to a later time today   No answer left vm

## 2024-11-11 ENCOUNTER — HOSPITAL ENCOUNTER (EMERGENCY)
Facility: HOSPITAL | Age: 29
Discharge: HOME | End: 2024-11-11
Attending: EMERGENCY MEDICINE | Admitting: EMERGENCY MEDICINE
Payer: COMMERCIAL

## 2024-11-11 VITALS
BODY MASS INDEX: 28.44 KG/M2 | DIASTOLIC BLOOD PRESSURE: 81 MMHG | HEART RATE: 124 BPM | WEIGHT: 192 LBS | SYSTOLIC BLOOD PRESSURE: 111 MMHG | OXYGEN SATURATION: 98 % | HEIGHT: 69 IN | RESPIRATION RATE: 16 BRPM | TEMPERATURE: 98.6 F

## 2024-11-11 DIAGNOSIS — R11.2 NAUSEA AND VOMITING, UNSPECIFIED VOMITING TYPE: Primary | ICD-10-CM

## 2024-11-11 LAB
ALBUMIN SERPL-MCNC: 4.2 G/DL (ref 3.5–5.2)
ALBUMIN/GLOB SERPL: 0.9 G/DL
ALP SERPL-CCNC: 78 U/L (ref 39–117)
ALT SERPL W P-5'-P-CCNC: 84 U/L (ref 1–33)
ANION GAP SERPL CALCULATED.3IONS-SCNC: 14.5 MMOL/L (ref 5–15)
ANISOCYTOSIS BLD QL: NORMAL
AST SERPL-CCNC: 145 U/L (ref 1–32)
BACTERIA UR QL AUTO: ABNORMAL /HPF
BASOPHILS # BLD AUTO: 0.03 10*3/MM3 (ref 0–0.2)
BASOPHILS NFR BLD AUTO: 0.4 % (ref 0–1.5)
BILIRUB SERPL-MCNC: 0.5 MG/DL (ref 0–1.2)
BILIRUB UR QL STRIP: NEGATIVE
BUN SERPL-MCNC: 7 MG/DL (ref 6–20)
BUN/CREAT SERPL: 11.7 (ref 7–25)
CALCIUM SPEC-SCNC: 9.4 MG/DL (ref 8.6–10.5)
CHLORIDE SERPL-SCNC: 97 MMOL/L (ref 98–107)
CLARITY UR: CLEAR
CO2 SERPL-SCNC: 21.5 MMOL/L (ref 22–29)
COLOR UR: YELLOW
CREAT SERPL-MCNC: 0.6 MG/DL (ref 0.57–1)
DEPRECATED RDW RBC AUTO: 43.9 FL (ref 37–54)
EGFRCR SERPLBLD CKD-EPI 2021: 125.6 ML/MIN/1.73
EOSINOPHIL # BLD AUTO: 0.1 10*3/MM3 (ref 0–0.4)
EOSINOPHIL NFR BLD AUTO: 1.5 % (ref 0.3–6.2)
ERYTHROCYTE [DISTWIDTH] IN BLOOD BY AUTOMATED COUNT: 18 % (ref 12.3–15.4)
GLOBULIN UR ELPH-MCNC: 4.5 GM/DL
GLUCOSE SERPL-MCNC: 325 MG/DL (ref 65–99)
GLUCOSE UR STRIP-MCNC: ABNORMAL MG/DL
GRAN CASTS URNS QL MICRO: ABNORMAL /LPF
HCG INTACT+B SERPL-ACNC: <0.2 MIU/ML
HCT VFR BLD AUTO: 37 % (ref 34–46.6)
HGB BLD-MCNC: 10.3 G/DL (ref 12–15.9)
HGB UR QL STRIP.AUTO: NEGATIVE
HOLD SPECIMEN: NORMAL
HYALINE CASTS UR QL AUTO: ABNORMAL /LPF
HYPOCHROMIA BLD QL: NORMAL
IMM GRANULOCYTES # BLD AUTO: 0.01 10*3/MM3 (ref 0–0.05)
IMM GRANULOCYTES NFR BLD AUTO: 0.1 % (ref 0–0.5)
KETONES UR QL STRIP: ABNORMAL
LEUKOCYTE ESTERASE UR QL STRIP.AUTO: NEGATIVE
LIPASE SERPL-CCNC: 13 U/L (ref 13–60)
LYMPHOCYTES # BLD AUTO: 2.93 10*3/MM3 (ref 0.7–3.1)
LYMPHOCYTES NFR BLD AUTO: 42.9 % (ref 19.6–45.3)
MCH RBC QN AUTO: 19 PG (ref 26.6–33)
MCHC RBC AUTO-ENTMCNC: 27.8 G/DL (ref 31.5–35.7)
MCV RBC AUTO: 68.1 FL (ref 79–97)
MONOCYTES # BLD AUTO: 0.52 10*3/MM3 (ref 0.1–0.9)
MONOCYTES NFR BLD AUTO: 7.6 % (ref 5–12)
NEUTROPHILS NFR BLD AUTO: 3.24 10*3/MM3 (ref 1.7–7)
NEUTROPHILS NFR BLD AUTO: 47.5 % (ref 42.7–76)
NITRITE UR QL STRIP: POSITIVE
PH UR STRIP.AUTO: 6 [PH] (ref 5–8)
PLATELET # BLD AUTO: 551 10*3/MM3 (ref 140–450)
PMV BLD AUTO: 9.7 FL (ref 6–12)
POLYCHROMASIA BLD QL SMEAR: NORMAL
POTASSIUM SERPL-SCNC: 4.7 MMOL/L (ref 3.5–5.2)
PROT SERPL-MCNC: 8.7 G/DL (ref 6–8.5)
PROT UR QL STRIP: ABNORMAL
RBC # BLD AUTO: 5.43 10*6/MM3 (ref 3.77–5.28)
RBC # UR STRIP: ABNORMAL /HPF
REF LAB TEST METHOD: ABNORMAL
SMALL PLATELETS BLD QL SMEAR: NORMAL
SODIUM SERPL-SCNC: 133 MMOL/L (ref 136–145)
SP GR UR STRIP: 1.02 (ref 1–1.03)
SQUAMOUS #/AREA URNS HPF: ABNORMAL /HPF
TRANS CELLS #/AREA URNS HPF: ABNORMAL /HPF
UROBILINOGEN UR QL STRIP: ABNORMAL
WBC # UR STRIP: ABNORMAL /HPF
WBC CLUMPS # UR AUTO: ABNORMAL /HPF
WBC MORPH BLD: NORMAL
WBC NRBC COR # BLD AUTO: 6.83 10*3/MM3 (ref 3.4–10.8)
WHOLE BLOOD HOLD COAG: NORMAL
WHOLE BLOOD HOLD SPECIMEN: NORMAL

## 2024-11-11 PROCEDURE — 81001 URINALYSIS AUTO W/SCOPE: CPT | Performed by: EMERGENCY MEDICINE

## 2024-11-11 PROCEDURE — 36415 COLL VENOUS BLD VENIPUNCTURE: CPT

## 2024-11-11 PROCEDURE — 85025 COMPLETE CBC W/AUTO DIFF WBC: CPT | Performed by: EMERGENCY MEDICINE

## 2024-11-11 PROCEDURE — 84702 CHORIONIC GONADOTROPIN TEST: CPT | Performed by: EMERGENCY MEDICINE

## 2024-11-11 PROCEDURE — 83690 ASSAY OF LIPASE: CPT | Performed by: EMERGENCY MEDICINE

## 2024-11-11 PROCEDURE — 80053 COMPREHEN METABOLIC PANEL: CPT | Performed by: EMERGENCY MEDICINE

## 2024-11-11 PROCEDURE — 99283 EMERGENCY DEPT VISIT LOW MDM: CPT

## 2024-11-11 PROCEDURE — 85007 BL SMEAR W/DIFF WBC COUNT: CPT | Performed by: EMERGENCY MEDICINE

## 2024-11-11 RX ORDER — CEFDINIR 300 MG/1
300 CAPSULE ORAL ONCE
Status: COMPLETED | OUTPATIENT
Start: 2024-11-11 | End: 2024-11-11

## 2024-11-11 RX ORDER — ONDANSETRON 4 MG/1
4 TABLET, FILM COATED ORAL EVERY 8 HOURS PRN
Qty: 15 TABLET | Refills: 0 | Status: SHIPPED | OUTPATIENT
Start: 2024-11-11 | End: 2024-11-16

## 2024-11-11 RX ORDER — CEFDINIR 300 MG/1
300 CAPSULE ORAL 2 TIMES DAILY
Qty: 14 CAPSULE | Refills: 0 | Status: SHIPPED | OUTPATIENT
Start: 2024-11-11 | End: 2024-11-18

## 2024-11-11 RX ORDER — SODIUM CHLORIDE 9 MG/ML
10 INJECTION, SOLUTION INTRAMUSCULAR; INTRAVENOUS; SUBCUTANEOUS AS NEEDED
Status: DISCONTINUED | OUTPATIENT
Start: 2024-11-11 | End: 2024-11-11 | Stop reason: HOSPADM

## 2024-11-11 RX ADMIN — CEFDINIR 300 MG: 300 CAPSULE ORAL at 14:23

## 2024-11-11 NOTE — FSED PROVIDER NOTE
Subjective  History of Present Illness:    Patient presents the ED with complaints of nausea vomiting.  States symptoms started last week and have continued throughout this week.  Patient also complains of slight intermittent lower abdominal pain.  Patient also states that she has been urinating more frequently.  Patient is unaware if she is pregnant.  States that she took 2 pregnancy test last night and believed they were negative, however family stated that they still faint lines.  Patient did recently start metformin, lisinopril, and Lexapro roughly 1-2 months ago.  Denies any known sick contacts.  Denies any fevers, chills, body aches, diarrhea, constipation, shortness of breath, or chest pain.  Patient denies any drinking, smoking, or illicit drug use.  No prior abdominal surgeries.  Denies any other symptoms or concerns at this time.    Nurses Notes reviewed and agree, including vitals, allergies, social history and prior medical history.     REVIEW OF SYSTEMS: All systems reviewed and not pertinent unless noted.  Review of Systems   Constitutional:  Positive for appetite change.   Gastrointestinal:  Positive for abdominal pain, nausea and vomiting.   Genitourinary:  Positive for frequency.   All other systems reviewed and are negative.      Past Medical History:   Diagnosis Date    Anemia     Anxiety     Asthma     Depression     Diabetes mellitus     GERD (gastroesophageal reflux disease)     Headache     Hypertension     Urinary tract infection        Allergies:    Patient has no known allergies.      History reviewed. No pertinent surgical history.      Social History     Socioeconomic History    Marital status: Single   Tobacco Use    Smoking status: Never    Smokeless tobacco: Never   Vaping Use    Vaping status: Never Used   Substance and Sexual Activity    Alcohol use: Not Currently    Drug use: Never    Sexual activity: Yes     Partners: Male     Birth control/protection: None         Family History  "  Problem Relation Age of Onset    Arthritis Father     Asthma Father     Hypertension Father     Diabetes Maternal Grandmother     Cancer Maternal Grandmother     Hypertension Paternal Grandmother     Arthritis Paternal Grandmother     Diabetes Paternal Grandmother     Miscarriages / Stillbirths Paternal Grandmother     Thyroid disease Paternal Grandmother        Objective  Physical Exam:  /74   Pulse 119   Temp 98.6 °F (37 °C) (Oral)   Resp 16   Ht 175.3 cm (69\")   Wt 87.1 kg (192 lb)   LMP 10/17/2024 (Approximate)   SpO2 100%   BMI 28.35 kg/m²      Physical Exam  Constitutional:       General: She is not in acute distress.     Appearance: Normal appearance. She is not ill-appearing.   HENT:      Head: Normocephalic and atraumatic.   Cardiovascular:      Rate and Rhythm: Regular rhythm. Tachycardia present.   Pulmonary:      Effort: Pulmonary effort is normal. No respiratory distress.      Breath sounds: Normal breath sounds.   Abdominal:      General: Abdomen is flat. Bowel sounds are normal.      Palpations: Abdomen is soft.      Tenderness: There is no abdominal tenderness.   Musculoskeletal:         General: No swelling or tenderness. Normal range of motion.      Cervical back: Normal range of motion and neck supple.   Skin:     General: Skin is warm and dry.   Neurological:      General: No focal deficit present.      Mental Status: She is alert and oriented to person, place, and time.   Psychiatric:         Mood and Affect: Mood normal.         Behavior: Behavior normal.       Procedures    ED Course:         Lab Results (last 24 hours)       Procedure Component Value Units Date/Time    Urinalysis With Microscopic If Indicated (No Culture) - Urine, Clean Catch [[id number]]  (Abnormal) Collected: 11/11/24 1303    Specimen: Urine, Clean Catch Updated: 11/11/24 1330     Color, UA Yellow     Appearance, UA Clear     pH, UA 6.0     Specific Gravity, UA 1.025     Glucose, UA >=1000 mg/dL (3+)     " Ketones, UA Trace     Bilirubin, UA Negative     Blood, UA Negative     Protein,  mg/dL (2+)     Leuk Esterase, UA Negative     Nitrite, UA Positive     Urobilinogen, UA 0.2 E.U./dL    Urinalysis, Microscopic Only - Urine, Clean Catch [[id number]]  (Abnormal) Collected: 11/11/24 1303    Specimen: Urine, Clean Catch Updated: 11/11/24 1336     RBC, UA 0-2 /HPF      WBC, UA 3-5 /HPF      Bacteria, UA 4+ /HPF      Squamous Epithelial Cells, UA 3-6 /HPF      Transitional Epithelial Cells, UA 0-2 /HPF      Hyaline Casts, UA 0-2 /LPF      Granular Casts, UA 0-2 /LPF      WBC Clumps, UA Small/1+ /HPF      Methodology Manual Light Microscopy    CBC & Differential [[id number]]  (Abnormal) Collected: 11/11/24 1315    Specimen: Blood Updated: 11/11/24 1353    Narrative:      The following orders were created for panel order CBC & Differential.  Procedure                               Abnormality         Status                     ---------                               -----------         ------                     CBC Auto Differential[[id number]]        Abnormal            Final result               Scan Slide[[id number]]                                       Final result                 Please view results for these tests on the individual orders.    Comprehensive Metabolic Panel [[id number]]  (Abnormal) Collected: 11/11/24 1315    Specimen: Blood Updated: 11/11/24 1343     Glucose 325 mg/dL      BUN 7 mg/dL      Creatinine 0.60 mg/dL      Sodium 133 mmol/L      Potassium 4.7 mmol/L      Comment: Specimen hemolyzed.  Result may be falsely elevated.        Chloride 97 mmol/L      CO2 21.5 mmol/L      Calcium 9.4 mg/dL      Total Protein 8.7 g/dL      Albumin 4.2 g/dL      ALT (SGPT) 84 U/L      Comment: Specimen hemolyzed.  Result may  be falsely elevated.        AST (SGOT) 145 U/L      Comment: Specimen hemolyzed.  Result may be falsely elevated.        Alkaline Phosphatase 78 U/L      Total Bilirubin 0.5 mg/dL       Globulin 4.5 gm/dL      A/G Ratio 0.9 g/dL      BUN/Creatinine Ratio 11.7     Anion Gap 14.5 mmol/L      eGFR 125.6 mL/min/1.73     Narrative:      GFR Normal >60  Chronic Kidney Disease <60  Kidney Failure <15      Lipase [[id number]]  (Normal) Collected: 11/11/24 1315    Specimen: Blood Updated: 11/11/24 1343     Lipase 13 U/L      Comment: Specimen hemolyzed.  Results may be falsely decreased.       hCG, Quantitative, Pregnancy [[id number]] Collected: 11/11/24 1315    Specimen: Blood Updated: 11/11/24 1350     HCG Quantitative <0.20 mIU/mL     Narrative:      HCG Ranges by Gestational Age    Females - non-pregnant premenopausal   </= 1mIU/mL HCG  Females - postmenopausal               </= 7mIU/mL HCG    3 Weeks         5.8 -    71.2 mIU/mL  4 Weeks         9.5 -     750 mIU/mL  5 Weeks         217 -   7,138 mIU/mL  6 Weeks         158 -  31,795 mIU/mL  7 Weeks       3,697 - 163,563 mIU/mL  8 Weeks      32,065 - 149,571 mIU/mL  9 Weeks      63,803 - 151,410 mIU/mL  10 Weeks     46,509 - 186,977 mIU/mL  12 Weeks     27,832 - 210,612 mIU/mL  14 Weeks     13,950 -  62,530 mIU/mL  15 Weeks     12,039 -  70,971 mIU/mL  16 Weeks      9,040 -  56,451 mIU/mL  17 Weeks      8,175 -  55,868 mIU/mL  18 Weeks      8,099 -  58,176 mIU/mL  Results may be falsely decreased if patient taking Biotin.      CBC Auto Differential [[id number]]  (Abnormal) Collected: 11/11/24 1315    Specimen: Blood Updated: 11/11/24 1331     WBC 6.83 10*3/mm3      RBC 5.43 10*6/mm3      Hemoglobin 10.3 g/dL      Hematocrit 37.0 %      MCV 68.1 fL      MCH 19.0 pg      MCHC 27.8 g/dL      RDW 18.0 %      RDW-SD 43.9 fl      MPV 9.7 fL      Platelets 551 10*3/mm3      Neutrophil % 47.5 %      Lymphocyte % 42.9 %      Monocyte % 7.6 %      Eosinophil % 1.5 %      Basophil % 0.4 %      Immature Grans % 0.1 %      Neutrophils, Absolute 3.24 10*3/mm3      Lymphocytes, Absolute 2.93 10*3/mm3      Monocytes, Absolute 0.52 10*3/mm3      Eosinophils, Absolute 0.10  10*3/mm3      Basophils, Absolute 0.03 10*3/mm3      Immature Grans, Absolute 0.01 10*3/mm3     Scan Slide [[id number]] Collected: 11/11/24 1315    Specimen: Blood Updated: 11/11/24 1353     Anisocytosis Slight/1+     Hypochromia Slight/1+     Polychromasia Slight/1+     WBC Morphology Normal     Platelet Estimate Increased             No radiology results from the last 24 hrs     MDM  Patient presented to the ED with complaints of nausea, vomiting, increased urinary frequency, and intermittent lower abdominal pain that started last week.  Patient's UA showed a UTI.  Pregnancy test was negative.  Patient had no tenderness on palpation on exam abdominal exam.  Patient did just recently start metformin which could be causing her symptoms.  Will treat patient for UTI.  Advised patient to follow-up with PCP for further evaluation and to return to ED if symptoms worsened.  Will also write Zofran for nausea.  Patient was agreeable to plan and discharge.    Medications   Sodium Chloride (PF) 0.9 % 10 mL (has no administration in time range)   cefdinir (OMNICEF) capsule 300 mg (300 mg Oral Given 11/11/24 1423)     -----  ED Disposition       ED Disposition   Discharge    Condition   Stable    Comment   --             Final diagnoses:   Nausea and vomiting, unspecified vomiting type      Your Follow-Up Providers       Ayla Oshea DO. Schedule an appointment as soon as possible for a visit in 3 days.    Specialty: Internal Medicine  Follow up details: As needed, If symptoms worsen  [address] [facility] [address] [phone]                       Contact information for after-discharge care    Follow-up information has not been specified.                    Your medication list        START taking these medications        Instructions Last Dose Given Next Dose Due   cefdinir 300 MG capsule  Commonly known as: OMNICEF      Take 1 capsule by mouth 2 (Two) Times a Day for 7 days.       ondansetron 4 MG  tablet  Commonly known as: Zofran      Take 1 tablet by mouth Every 8 (Eight) Hours As Needed for Nausea or Vomiting for up to 5 days.              CONTINUE taking these medications        Instructions Last Dose Given Next Dose Due   dicyclomine 10 MG capsule  Commonly known as: BENTYL      Take 1 capsule by mouth 4 (Four) Times a Day As Needed (pain).       escitalopram 10 MG tablet  Commonly known as: Lexapro      Take 1 tablet by mouth Daily.       ferrous gluconate 324 MG tablet  Commonly known as: FERGON      Take 1 tablet by mouth Daily With Breakfast.       glucose blood test strip      Use as instructed       glucose monitor monitoring kit      Use 1 each Daily.       lisinopril 5 MG tablet  Commonly known as: PRINIVIL,ZESTRIL      Take 1 tablet by mouth Daily.       loperamide 2 MG capsule  Commonly known as: IMODIUM      Take 1 capsule by mouth 4 (Four) Times a Day As Needed for Diarrhea.       metFORMIN  MG 24 hr tablet  Commonly known as: GLUCOPHAGE-XR      Take 1 tablet by mouth Daily With Breakfast.       ondansetron ODT 4 MG disintegrating tablet  Commonly known as: ZOFRAN-ODT      Place 1 tablet on the tongue Every 8 (Eight) Hours As Needed for Nausea or Vomiting for up to 6 doses.       ONE TOUCH ULTRA 2 w/Device kit      1 each by Other route Daily.       onetouch ultrasoft lancets      Use to check glucose 3 times daily       Semaglutide(0.25 or 0.5MG/DOS) 2 MG/3ML solution pen-injector  Commonly known as: OZEMPIC  Start taking on: September 18, 2024      Inject 0.25 mg under the skin into the appropriate area as directed 1 (One) Time Per Week for 28 days, THEN 0.5 mg 1 (One) Time Per Week for 28 days.                 Where to Get Your Medications        These medications were sent to [facility] [address] [place] [address] - [address] [facility] - [phone]  - [phone]   [address] [facility] [address]      Phone: [phone]   cefdinir 300 MG capsule  ondansetron  4 MG tablet

## 2024-11-11 NOTE — Clinical Note
[facility] EMERGENCY DEPARTMENT [place]  [address]  [facility] [address]  Phone: [phone]  Fax: [phone]    Rashid Iglesias was seen and treated in our emergency department on 11/11/2024.  She may return to work on 11/15/2024.         Thank you for choosing [facility].    Darci Carias MD

## 2025-05-06 ENCOUNTER — HOSPITAL ENCOUNTER (EMERGENCY)
Facility: HOSPITAL | Age: 30
Discharge: HOME | End: 2025-05-06
Attending: EMERGENCY MEDICINE | Admitting: EMERGENCY MEDICINE
Payer: COMMERCIAL

## 2025-05-06 VITALS
WEIGHT: 194 LBS | OXYGEN SATURATION: 100 % | HEIGHT: 69 IN | RESPIRATION RATE: 16 BRPM | TEMPERATURE: 99 F | BODY MASS INDEX: 28.73 KG/M2 | DIASTOLIC BLOOD PRESSURE: 95 MMHG | SYSTOLIC BLOOD PRESSURE: 139 MMHG | HEART RATE: 113 BPM

## 2025-05-06 DIAGNOSIS — J06.9 ACUTE UPPER RESPIRATORY INFECTION: Primary | ICD-10-CM

## 2025-05-06 LAB
FLUAV RNA RESP QL NAA+PROBE: NOT DETECTED
FLUBV RNA RESP QL NAA+PROBE: NOT DETECTED
RSV RNA RESP QL NAA+PROBE: NOT DETECTED
S PYO AG THROAT QL: NEGATIVE
SARS-COV-2 RNA RESP QL NAA+PROBE: NOT DETECTED

## 2025-05-06 PROCEDURE — 87880 STREP A ASSAY W/OPTIC: CPT | Performed by: PHYSICIAN ASSISTANT

## 2025-05-06 PROCEDURE — 87081 CULTURE SCREEN ONLY: CPT | Performed by: PHYSICIAN ASSISTANT

## 2025-05-06 PROCEDURE — 99283 EMERGENCY DEPT VISIT LOW MDM: CPT | Performed by: EMERGENCY MEDICINE

## 2025-05-06 PROCEDURE — 87637 SARSCOV2&INF A&B&RSV AMP PRB: CPT | Performed by: PHYSICIAN ASSISTANT

## 2025-05-06 RX ORDER — FLUTICASONE PROPIONATE 50 MCG
2 SPRAY, SUSPENSION (ML) NASAL DAILY
Qty: 15.8 G | Refills: 0 | Status: SHIPPED | OUTPATIENT
Start: 2025-05-06

## 2025-05-06 NOTE — Clinical Note
[facility] EMERGENCY DEPARTMENT [place]  [address]  [facility] [address]  Phone: [phone]  Fax: [phone]    Rashid Iglesias was seen and treated in our emergency department on 5/6/2025.  She may return to work on 05/09/2025.         Thank you for choosing [facility].    Roxanne Goldman RN

## 2025-05-06 NOTE — Clinical Note
[facility] EMERGENCY DEPARTMENT [place]  [address]  [facility] [address]  Phone: [phone]  Fax: [phone]    Rashid Iglesias was seen and treated in our emergency department on 5/6/2025.  She may return to work on 05/09/2025.         Thank you for choosing [facility].    Roxanne Goldman RN

## 2025-05-06 NOTE — FSED PROVIDER NOTE
"Subjective  History of Present Illness:    This is a 30-year-old female who presents with complaints of 1 day of sore throat, cough and general malaise.  Patient states that she has not had a fever.  No nausea or vomiting.  No diarrhea.  She states that her children have allergy symptoms.  She has not taken any medications for her symptoms.  Past medical history of type 2 diabetes, asthma, hypertension, acid reflux.      Nurses Notes reviewed and agree, including vitals, allergies, social history and prior medical history.     REVIEW OF SYSTEMS: All systems reviewed and not pertinent unless noted.  Review of Systems    Past Medical History:   Diagnosis Date    Anemia     Anxiety     Asthma     Depression     Diabetes mellitus     GERD (gastroesophageal reflux disease)     Headache     Hypertension     Urinary tract infection        Allergies:    Patient has no known allergies.      No past surgical history on file.      Social History     Socioeconomic History    Marital status:    Tobacco Use    Smoking status: Never    Smokeless tobacco: Never   Vaping Use    Vaping status: Never Used   Substance and Sexual Activity    Alcohol use: Not Currently    Drug use: Never    Sexual activity: Yes     Partners: Male     Birth control/protection: None         Family History   Problem Relation Age of Onset    Arthritis Father     Asthma Father     Hypertension Father     Diabetes Maternal Grandmother     Cancer Maternal Grandmother     Hypertension Paternal Grandmother     Arthritis Paternal Grandmother     Diabetes Paternal Grandmother     Miscarriages / Stillbirths Paternal Grandmother     Thyroid disease Paternal Grandmother        Objective  Physical Exam:  /95 (BP Location: Left arm, Patient Position: Sitting)   Pulse 113   Temp 99 °F (37.2 °C) (Oral)   Resp 16   Ht 175.3 cm (69\")   Wt 88 kg (194 lb)   LMP 04/10/2025 (Exact Date)   SpO2 100%   BMI 28.65 kg/m²      Physical Exam  Vitals and nursing " note reviewed.   Constitutional:       Appearance: Normal appearance.   HENT:      Right Ear: Tympanic membrane normal.      Left Ear: Tympanic membrane normal.      Mouth/Throat:      Mouth: Mucous membranes are moist.      Pharynx: No oropharyngeal exudate or posterior oropharyngeal erythema.   Eyes:      Extraocular Movements: Extraocular movements intact.      Pupils: Pupils are equal, round, and reactive to light.   Cardiovascular:      Rate and Rhythm: Normal rate and regular rhythm.      Pulses: Normal pulses.   Pulmonary:      Effort: Pulmonary effort is normal.      Breath sounds: Normal breath sounds.   Abdominal:      General: Abdomen is flat.   Musculoskeletal:         General: Normal range of motion.      Cervical back: Normal range of motion.   Skin:     General: Skin is warm.   Neurological:      General: No focal deficit present.      Mental Status: She is alert.         Procedures    ED Course:         Lab Results (last 24 hours)       Procedure Component Value Units Date/Time    Rapid Strep A Screen - Swab, Throat [[id number]]  (Normal) Collected: 05/06/25 1031    Specimen: Swab from Throat Updated: 05/06/25 1104     Strep A Ag Negative    COVID-19, FLU A/B, RSV PCR 1 HR TAT - Swab, Nasopharynx [[id number]]  (Normal) Collected: 05/06/25 1031    Specimen: Swab from Nasopharynx Updated: 05/06/25 1120     COVID19 Not Detected     Influenza A PCR Not Detected     Influenza B PCR Not Detected     RSV, PCR Not Detected    Narrative:      Fact sheet for providers: https://www.fda.gov/media/022291/download    Fact sheet for patients: https://www.fda.gov/media/773549/download    Test performed by PCR.    Beta Strep Culture, Throat - Swab, Throat [[id number]] Collected: 05/06/25 1031    Specimen: Swab from Throat Updated: 05/06/25 1104             No radiology results from the last 24 hrs       MDM      Initial impression of presenting illness: This is a 30-year-old female who presents with complaints of URI  symptoms.  Patient found to have negative COVID-19, influenza and strep.  Discussed this is likely viral illness, care supportive.  Discussed Flonase, over-the-counter cough and cold medications, Tylenol and ibuprofen as needed for pain or fever.  Discussed pushing fluids.  Discussed return precautions.  Discussed all findings and plan of care with the patient is agreeable to discharge.    DDX: includes but is not limited to: Strep pharyngitis, viral illness, pneumonia, allergies        Medications - No data to display      Data interpreted: Nursing notes reviewed, vital signs reviewed.  Labs independently interpreted by me (CBC, CMP, lipase, UA, troponin, ABG, lactic acid, procalcitonin).  Imaging independently interpreted by me (x-ray, CT scan).  EKG independently interpreted by me.  O2 saturation: 100%    Counseling: Discussed the results above with the patient regarding need for admission or discharge.  Patient understands and agrees plan of care.      -----  ED Disposition       ED Disposition   Discharge    Condition   Stable    Comment   --             Final diagnoses:   Acute upper respiratory infection      Your Follow-Up Providers       Go to  [facility] EMERGENCY DEPARTMENT [place].    Specialty: Emergency Medicine  Follow up details: As needed, If symptoms worsen  [address]  [facility] [address] [phone]             Ayla Oshea, .    Specialty: Internal Medicine  [address] [place], [place] [address] [phone]                       Contact information for after-discharge care    Follow-up information has not been specified.                    Your medication list        START taking these medications        Instructions Last Dose Given Next Dose Due   fluticasone 50 MCG/ACT nasal spray  Commonly known as: FLONASE      Administer 2 sprays into the nostril(s) as directed by provider Daily.              CONTINUE taking these medications         Instructions Last Dose Given Next Dose Due   dicyclomine 10 MG capsule  Commonly known as: BENTYL      Take 1 capsule by mouth 4 (Four) Times a Day As Needed (pain).       escitalopram 10 MG tablet  Commonly known as: Lexapro      Take 1 tablet by mouth Daily.       ferrous gluconate 324 MG tablet  Commonly known as: FERGON      Take 1 tablet by mouth Daily With Breakfast.       glucose blood test strip      Use as instructed       glucose monitor monitoring kit      Use 1 each Daily.       lisinopril 5 MG tablet  Commonly known as: PRINIVIL,ZESTRIL      Take 1 tablet by mouth Daily.       loperamide 2 MG capsule  Commonly known as: IMODIUM      Take 1 capsule by mouth 4 (Four) Times a Day As Needed for Diarrhea.       metFORMIN  MG 24 hr tablet  Commonly known as: GLUCOPHAGE-XR      Take 1 tablet by mouth Daily With Breakfast.       ondansetron ODT 4 MG disintegrating tablet  Commonly known as: ZOFRAN-ODT      Place 1 tablet on the tongue Every 8 (Eight) Hours As Needed for Nausea or Vomiting for up to 6 doses.       ONE TOUCH ULTRA 2 w/Device kit      1 each by Other route Daily.       onetouch ultrasoft lancets      Use to check glucose 3 times daily                 Where to Get Your Medications        These medications were sent to [facility] [address] [place] [address] - [address] [facility] - [phone]  - [phone]   [address] [facility] [address]      Phone: [phone]   fluticasone 50 MCG/ACT nasal spray

## 2025-05-06 NOTE — Clinical Note
[facility] EMERGENCY DEPARTMENT [place]  [address]  [facility] [address]  Phone: [phone]  Fax: [phone]    Rashid Iglesias was seen and treated in our emergency department on 5/6/2025.  She may return to work on 05/07/2025.         Thank you for choosing [facility].    Nannette San PA-C

## 2025-05-06 NOTE — DISCHARGE INSTRUCTIONS
Being discharged home with 1 medication.  Take the medication as prescribed.  Return to the emergency department for any worsening symptoms.  Please follow-up with your primary care provider if symptoms persist

## 2025-05-08 LAB — BACTERIA SPEC AEROBE CULT: NORMAL
